# Patient Record
Sex: MALE | Race: WHITE | NOT HISPANIC OR LATINO | Employment: FULL TIME | URBAN - METROPOLITAN AREA
[De-identification: names, ages, dates, MRNs, and addresses within clinical notes are randomized per-mention and may not be internally consistent; named-entity substitution may affect disease eponyms.]

---

## 2024-02-02 ENCOUNTER — HOSPITAL ENCOUNTER (OUTPATIENT)
Facility: HOSPITAL | Age: 44
Discharge: HOME | End: 2024-02-02
Payer: COMMERCIAL

## 2024-02-02 ENCOUNTER — OFFICE VISIT (OUTPATIENT)
Dept: INTERNAL MEDICINE | Age: 44
End: 2024-02-02
Payer: COMMERCIAL

## 2024-02-02 VITALS
HEIGHT: 68 IN | HEART RATE: 82 BPM | BODY MASS INDEX: 27.58 KG/M2 | OXYGEN SATURATION: 94 % | TEMPERATURE: 97.6 F | DIASTOLIC BLOOD PRESSURE: 80 MMHG | SYSTOLIC BLOOD PRESSURE: 110 MMHG | WEIGHT: 182 LBS

## 2024-02-02 DIAGNOSIS — Z00.00 ANNUAL PHYSICAL EXAM: Primary | ICD-10-CM

## 2024-02-02 DIAGNOSIS — M54.50 LUMBAR PAIN: ICD-10-CM

## 2024-02-02 PROCEDURE — 72100 X-RAY EXAM L-S SPINE 2/3 VWS: CPT

## 2024-02-02 NOTE — PROGRESS NOTES
"    I N T E R N A L  M E D I C I KALPESH E  GREY Everett      ENCOUNTER DATE:  2024    Ivan Aslanov / 43 y.o. / male    CHIEF COMPLAINT     Establish Care    [place]  used at today's appointment.    In , was told he had a lumbar disc herniation from a hospital in [place].  Has ongoing intermittent episodes of left lumbar pain with radiation to buttock, left hip and leg.   No numbness, tingling, weakness, bowel/ bladder changes.  He has been able to manage his pain symptoms through home exercises.  Denies any prior surgeries.      VITALS     Visit Vitals  /80   Pulse 82   Temp 97.6 °F (36.4 °C)   Ht 173 cm (68.11\")   Wt 82.6 kg (182 lb)   SpO2 94%   BMI 27.58 kg/m²       BP Readings from Last 3 Encounters:   24 110/80     Wt Readings from Last 3 Encounters:   24 82.6 kg (182 lb)      Body mass index is 27.58 kg/m².      MEDICATIONS     No current outpatient medications on file prior to visit.     No current facility-administered medications on file prior to visit.         HISTORY OF PRESENT ILLNESS      Ivan presents for annual health maintenance visit.    General health: fair  Lifestyle:  Attempting to lose weight?: No   Diet: eats moderately healthy  Exercise: exercises occasionally  Tobacco: Former smoker   Alcohol: occasional/infrequent  Work: Not working currently but looking  Reproductive health:  Sexually active?: Yes   Concern for STD?: No   Sexual problems?: No problems   Sees Urologist?: No   Depression Screenin/2/2024    11:06 AM   PHQ-2/PHQ-9 Depression Screening   Little Interest or Pleasure in Doing Things 0-->not at all   Feeling Down, Depressed or Hopeless 0-->not at all   PHQ-9: Brief Depression Severity Measure Score 0         PHQ-2: 0 (Not depressed)     PHQ-9: 0 (Negative screening for depression)    Patient Care Team:  Jazzmine Rios APRN as PCP - General (Family " Medicine)  ______________________________________________________________________    ALLERGIES  Allergies   Allergen Reactions    Other Anaphylaxis     Metamizole        PFSH:     The following portions of the patient's history were reviewed and updated as appropriate: Allergies / Current Medications / Past Medical History / Surgical History / Social History / Family History    PROBLEM LIST   There is no problem list on file for this patient.      PAST MEDICAL HISTORY  History reviewed. No pertinent past medical history.    SURGICAL HISTORY  History reviewed. No pertinent surgical history.    SOCIAL HISTORY  Social History     Socioeconomic History    Marital status:    Tobacco Use    Smoking status: Former     Packs/day: 1.00     Years: 15.00     Additional pack years: 0.00     Total pack years: 15.00     Types: Cigarettes     Start date: 1/1/2009    Smokeless tobacco: Never   Vaping Use    Vaping Use: Never used   Substance and Sexual Activity    Alcohol use: Yes     Comment: 1 day weekly    Drug use: Never    Sexual activity: Yes     Partners: Female       FAMILY HISTORY  Family History   Problem Relation Age of Onset    Hypertension Mother        IMMUNIZATION HISTORY    There is no immunization history on file for this patient.      REVIEW OF SYSTEMS     Review of Systems   Constitutional:  Negative for chills, fever and unexpected weight change.   Respiratory:  Negative for cough, chest tightness and shortness of breath.    Cardiovascular:  Negative for chest pain, palpitations and leg swelling.   Musculoskeletal:  Positive for back pain.   Neurological:  Negative for dizziness, weakness, light-headedness and headaches.   Psychiatric/Behavioral:  The patient is not nervous/anxious.        PHYSICAL EXAMINATION     Physical Exam  Vitals reviewed.   Constitutional:       General: He is not in acute distress.     Appearance: Normal appearance. He is not ill-appearing, toxic-appearing or diaphoretic.   HENT:  "     Head: Normocephalic and atraumatic.      Right Ear: Tympanic membrane, ear canal and external ear normal. There is no impacted cerumen.      Left Ear: Tympanic membrane, ear canal and external ear normal. There is no impacted cerumen.      Nose: Nose normal. No congestion or rhinorrhea.      Mouth/Throat:      Mouth: Mucous membranes are moist.      Pharynx: Oropharynx is clear. No oropharyngeal exudate or posterior oropharyngeal erythema.   Eyes:      Extraocular Movements: Extraocular movements intact.      Conjunctiva/sclera: Conjunctivae normal.      Pupils: Pupils are equal, round, and reactive to light.   Cardiovascular:      Rate and Rhythm: Normal rate and regular rhythm.      Heart sounds: Normal heart sounds.   Pulmonary:      Effort: Pulmonary effort is normal. No respiratory distress.      Breath sounds: Normal breath sounds.   Abdominal:      General: Bowel sounds are normal.      Palpations: Abdomen is soft.      Tenderness: There is no abdominal tenderness.   Musculoskeletal:         General: Normal range of motion.      Cervical back: Normal range of motion and neck supple.      Lumbar back: No tenderness or bony tenderness.      Right lower leg: No edema.      Left lower leg: No edema.   Lymphadenopathy:      Cervical: No cervical adenopathy.   Skin:     General: Skin is warm and dry.   Neurological:      General: No focal deficit present.      Mental Status: He is alert and oriented to person, place, and time. Mental status is at baseline.      Sensory: No sensory deficit.      Motor: No weakness.   Psychiatric:         Mood and Affect: Mood normal.         Behavior: Behavior normal.         Thought Content: Thought content normal.         Judgment: Judgment normal.         REVIEWED DATA      Labs:    No results found for: \"NA\", \"K\", \"CALCIUM\", \"AST\", \"ALT\", \"BUN\", \"CREATININE\", \"EGFRIFNONA\", \"EGFRIFAFRI\"    No results found for: \"GLUCOSE\", \"HGBA1C\", \"TSH\", \"FREET4\"    No results found for: " "\"PSA\"    [unfilled]    No results found for: \"LDL\", \"HDL\", \"TRIG\", \"CHOLHDLRATIO\"    No components found for: \"BOHZ048O\"    No results found for: \"WBC\", \"HGB\", \"MCV\", \"PLT\"    No results found for: \"PROTEIN\", \"GLUCOSEU\", \"BLOODU\", \"NITRITEU\", \"LEUKOCYTESUR\"     No results found for: \"HEPCVIRUSABY\"    Imaging:           Medical Tests:           ASSESSMENT & PLAN     ANNUAL WELLNESS EXAM / PHYSICAL     Diagnoses and all orders for this visit:    1. Annual physical exam (Primary)  -     CBC & Differential  -     Comprehensive Metabolic Panel  -     Hemoglobin A1c  -     Lipid Panel With / Chol / HDL Ratio  -     TSH+Free T4  -     Urinalysis without microscopic (no culture) - Urine, Clean Catch  -     Hepatitis C Antibody    2. Lumbar pain  -     XR Spine Lumbar 2 or 3 View  -     Ambulatory Referral to Physical Therapy Evaluate and treat         Summary/Discussion:     For his ongoing lumbar pain symptoms, recommend updating XR and referral to PT.  He was instructed on importance of monitoring for any extremity numbness, tingling, weakness, bowel/ bladder changes, saddle anesthesia, for which he would need evaluation in the ER.      BMI is >= 25 and <30. (Overweight) The following options were offered after discussion;: exercise counseling/recommendations and nutrition counseling/recommendations    Next Appointment with me: Visit date not found    Return in about 1 year (around 2/2/2025) for Annual physical.      HEALTHCARE MAINTENANCE ISSUES       Cancer Screening:  Colon: Initial/Next screening at age: 46  Repeat colon cancer screening: N/A at this time  Prostate: No screening needed at this time  Testicular: Recommended monthly self exam  Skin: Monthly self skin examination, annual exam by health professional  Lung: Does not meet criteria for lung cancer screening.   Other:    Screening Labs & Tests:  Lab results reviewed & discussed with with patient or orders placed today.  EKG:  CV Screening: Lipid panel  DEXA (75+ " or risk factors):   HEP C (If born [birth year] or risk factors): Ordered  Other:     Immunization/Vaccinations (to be given today unless deferred by patient)  Influenza: Recommended annual influenza vaccine  Hepatitis A: Verify immunization records  Hepatitis B: Verify immunization records  Tetanus/Pertussis: Up to date  Pneumovax/PCV: Not needed at this time  Shingles: Not needed at this time  COVID: Does not plan to get the latest booster  Lifestyle Counseling:  Lifestyle Modifications: Attempt to lose weight, Improve dietary compliance, Begin progressive aerobic exercise program 3-5 days a week, Continue to abstain from smoking, and Reduce exposure to stress if possible  Safety Issues: Always wear seatbelt, Avoid texting while driving   Use sunscreen, regular skin examination  Recommended annual dental/vision examination.  Emotional/Stress/Sleep: Reviewed and  given when appropriate      Health Maintenance   Topic Date Due    HEPATITIS C SCREENING  Never done    COVID-19 Vaccine (2 - 2023-24 season) 02/04/2024 (Originally 9/1/2023)    ANNUAL PHYSICAL  02/02/2025    BMI FOLLOWUP  02/02/2025    TDAP/TD VACCINES (2 - Td or Tdap) 11/09/2033    INFLUENZA VACCINE  Completed    Pneumococcal Vaccine 0-64  Aged Out

## 2024-03-11 ENCOUNTER — TREATMENT (OUTPATIENT)
Age: 44
End: 2024-03-11
Payer: COMMERCIAL

## 2024-03-11 DIAGNOSIS — M79.605 PAIN OF LEFT LOWER EXTREMITY: ICD-10-CM

## 2024-03-11 DIAGNOSIS — M54.50 LUMBAR PAIN: Primary | ICD-10-CM

## 2024-03-11 NOTE — PROGRESS NOTES
Physical Therapy Initial Evaluation and Plan of Care      Patient: Ivan Baldwin   : 1980  Diagnosis/ICD-10 Code:  Lumbar pain [M54.50]  Referring practitioner: GREY Everett  Date of Initial Visit: 3/11/2024  Today's Date: 3/11/2024  Patient seen for 1 sessions         Visit Diagnoses:     ICD-10-CM ICD-9-CM   1. Lumbar pain  M54.50 724.2   2. Pain of left lower extremity  M79.605 729.5       Subjective Questionnaire: Oswestry: 10/50    Subjective Evaluation    History of Present Illness    Subjective comment: Pt is 42 y/o M who speaks [place] and requires translation services. He presents to therapy c/o low back pain that he has been dealing with for approx. 8-9 years. He reports pain begins in mid lumbar region and travels down L LE. Denies previous treatment for back pain and denies N/T. He reports difficulty sitting, standing, and walking.  Patient Occupation: Currently unemployed Pain  Current pain ratin  At best pain ratin  Location: Mid lumbar region radiating into LLE  Alleviating factors: stretching.  Exacerbated by: weather.    Patient Goals  Patient goals for therapy: decreased pain           Objective          Static Posture     Thoracic Spine  Hypokyphosis.    Lumbar Spine   Increased lordosis.     Tenderness     Lumbar Spine  Tenderness in the spinous process.     Additional Tenderness Details  Sacrum  L piriformis    Neurological Testing     Sensation     Lumbar   Left   Intact: light touch    Right   Intact: light touch    Active Range of Motion     Lumbar   Flexion: 88 degrees with pain  Extension: 15 degrees with pain  Left lateral flexion: 27 degrees   Right lateral flexion: 21 degrees with pain  Left rotation: WFL and with pain  Right rotation: WFL    Additional Active Range of Motion Details  Decreased joint mobility upper and mid thoracic spine    Strength/Myotome Testing     Left Hip   Planes of Motion   Flexion: 5  Extension: 4-    Right Hip   Planes of Motion    Flexion: 5  Extension: 4    Left Knee   Flexion: 5  Extension: 5    Right Knee   Flexion: 5  Extension: 5    Left Ankle/Foot   Dorsiflexion: 5    Right Ankle/Foot   Dorsiflexion: 5    Additional Strength Details  Noted pain with L knee ext    Lower Ab: 4-/5    Tests       Thoracic   Positive slump.     Lumbar     Left   Positive passive SLR.     Additional Tests Details  No change with long axis traction    Slump + on L    Lumbar Flexibility Comments:   B HS mild limited  B piriformis mild limited    Ambulation     Observational Gait   Walking speed and stride length within functional limits.     Quality of Movement During Gait   Trunk    Trunk (Right): Positive lateral lean over stance limb.         Patient Education: Educated pt on HEP and POC.    Assessment & Plan       Assessment  Impairments: abnormal gait, abnormal or restricted ROM, activity intolerance, impaired physical strength, lacks appropriate home exercise program and pain with function   Functional limitations: walking, uncomfortable because of pain, sitting and standing   Assessment details: Pt is 43 y.o. male who speaks [place] and requires translation services. He presents to therapy c/o low back pain that he has been dealing with for approx. 8-9 years. He reports pain begins in mid lumbar region and travels down L LE. He reports difficulty sitting, standing, and walking. Modified Oswestry score 10/50. Deficits observed in LAROM, BLE strength, core strength, BLE, and gait. Pt requires skilled therapy addressing deficits in order to return to PLOF. Educated pt on HEP and POC, pt verbalized understanding.  Prognosis: good    Goals  Plan Goals: STG 4 Weeks:    Pt will demonstrate understanding of initial HEP without need for cueing.    Pt will rate pain 3/10 or less for ease with sitting.    Pt will demonstrate increased flexibility of B piriformis to WNL for decreased pain with ADLs.      LTG by Discharge:    Pt will be independent with HEP for  return to PLOF with decreased symptoms.    Pt will rate pain 2/10 or less for ease with standing.    Pt will demonstrate increased strength of B hip ext and lower abdominals to 4+/5 or greater for improvement and stability in gait.    Pt will ambulate community distances with gait mechanics WNL without pain for return to PLOF.    Oswestry score will improve to 5/10 or better for improvement in function with ADLs.    Plan  Therapy options: will be seen for skilled therapy services  Planned modality interventions: cryotherapy, dry needling, ultrasound, traction, thermotherapy (hydrocollator packs) and TENS  Planned therapy interventions: abdominal trunk stabilization, manual therapy, neuromuscular re-education, body mechanics training, postural training, flexibility, functional ROM exercises, home exercise program, soft tissue mobilization, spinal/joint mobilization, strengthening, stretching, therapeutic activities and joint mobilization  Frequency: 2x week  Duration in weeks: 12  Treatment plan discussed with: patient        History # of Personal Factors and/or Comorbidities: LOW (0)  Examination of Body System(s): # of elements: LOW (1-2)  Clinical Presentation: STABLE   Clinical Decision Making: LOW       Timed:         Manual Therapy:         mins  96289;     Therapeutic Exercise:    10     mins  50308;     Neuromuscular Nichole:        mins  12303;    Therapeutic Activity:          mins  40594;     Gait Training:           mins  18110;     Ultrasound:          mins  29660;    Ionto                                   mins   38788  Self Care                            mins   03380      Un-Timed:  Electrical Stimulation:         mins  33666 ( );  Traction          mins 46169  Low Eval     45     Mins  08672  Mod Eval          Mins  33561  High Eval                            Mins  81219        Timed Treatment:   10   mins   Total Treatment:     55   mins      PT SIGNATURE: Tanya Cabrera PT   Physical  Therapist  [place] License #: [id number]  [place] License #: [id number]    DATE TREATMENT INITIATED: 3/11/2024    Initial Certification  Certification Period: 3/11/2024 thru 6/8/2024  I certify that the therapy services are furnished while this patient is under my care.  The services outlined above are required by this patient, and will be reviewed every 90 days.      Physician Signature: _________________________  PHYSICIAN: Jazzmine Rios APRN   NPI: [id number]                                             DATE: _____________________________________    Please sign and return via fax to [phone]. Thank you, [facility].

## 2024-03-21 ENCOUNTER — TREATMENT (OUTPATIENT)
Age: 44
End: 2024-03-21
Payer: COMMERCIAL

## 2024-03-21 DIAGNOSIS — M79.605 PAIN OF LEFT LOWER EXTREMITY: ICD-10-CM

## 2024-03-21 DIAGNOSIS — M54.50 LUMBAR PAIN: Primary | ICD-10-CM

## 2024-03-21 PROCEDURE — 97535 SELF CARE MNGMENT TRAINING: CPT | Performed by: PHYSICAL THERAPIST

## 2024-03-21 PROCEDURE — 97110 THERAPEUTIC EXERCISES: CPT | Performed by: PHYSICAL THERAPIST

## 2024-03-21 PROCEDURE — 97530 THERAPEUTIC ACTIVITIES: CPT | Performed by: PHYSICAL THERAPIST

## 2024-03-21 PROCEDURE — 97116 GAIT TRAINING THERAPY: CPT | Performed by: PHYSICAL THERAPIST

## 2024-03-21 NOTE — PROGRESS NOTES
Physical Therapy Daily Treatment Note    [facility] - [facility]  [address]  [address]  [place] [address]     Patient: Ivan Baldwin   : 1980  Referring practitioner: GREY Everett  Date of Initial Visit: Type: THERAPY  Noted: 3/11/2024  Today's Date: 3/21/2024  Patient seen for 2 sessions         Ivan Baldwin reports: via  that he has a spot on the left side of his low back that is bothersome/painful.   States that he felt better after initial exercise performance today but noted an increase in left low back and buttock after treadmill walking.  Symptoms/discomfort decreased after performance of exercise regimen again.        Subjective     Objective   -tender left PSIS/buttock with palpation  -noted pelvic malalignment L but able to correct with isometric sequencing regimen    See Exercise, Manual, and Modality Logs for complete treatment.   Discussed via :  Exercise rationale/ pain free exercise performance  Anatomy and structure of affected musculature  Ice/heat application  Lumbar support - towel roll  Log roll  Squat/golfers lift vs bend at waist   Alternate exercise positions  Verbal/Tactile cues to ensure correct exercise performance/technique    Added:Nu step, sktc, hip add ball squeezes, hip abd isometric belt/grey band    -updated HEP program and issued to patient.  Removed hs stretch with strap.  Hold ppt, bridge, etc.    Treadmill walking x 8 min for gait normalization(increased step/stride length/foot clearance) and positional trunk maintenance.  1.2-1.6 mph    Assessment/Plan  Exercise performance results in decreased patient subjective complaints of pain left sided low back.  Evident L pelvic malalignment that responds to corrective sequencing exercises.  Benefits from education regarding condition and strategies to improve posture with sitting/driving, lifting techniques and log roll out of bed.  Should improve with continued PT  intervention        Progress per Plan of Care and Progress strengthening /stabilization /functional activity           Timed:  Manual Therapy:         mins  57630;  Therapeutic Exercise:    25     mins  30745;     Neuromuscular Nichole:        mins  20114;    Therapeutic Activity:    10      mins  11439;     Gait Trainin    mins  11008;     Ultrasound:          mins  28857;    Self Care                    _10__      mins 07284    Untimed:  Electrical Stimulation:         mins  84794 ( );  Mechanical Traction:         mins  73069;     Timed Treatment:  53   mins   Total Treatment:     53   mins  Dwayne Leggett [facility]  Physical Therapist  Assistant  [id number]

## 2024-03-25 ENCOUNTER — TREATMENT (OUTPATIENT)
Age: 44
End: 2024-03-25
Payer: COMMERCIAL

## 2024-03-25 DIAGNOSIS — M79.605 PAIN OF LEFT LOWER EXTREMITY: ICD-10-CM

## 2024-03-25 DIAGNOSIS — M54.50 LUMBAR PAIN: Primary | ICD-10-CM

## 2024-03-25 NOTE — PROGRESS NOTES
Physical Therapy Daily Treatment Note    [facility] - [facility]  [address]  [address]  [place] [address]     Patient: Ivan Baldwin   : 1980  Referring practitioner: GREY Everett  Date of Initial Visit: Type: THERAPY  Noted: 3/11/2024  Today's Date: 3/25/2024  Patient seen for 3 sessions         Ivan Baldwin reports: via  that his back felt better over the weekend.  Notes that primary discomfort is along L low back(PSIS) and hip(along piriformis and glut minimus insertion)        Subjective     Objective   -normal pelvic alignment noted this session  -tender L PSIS, L piriformis and glut minimus insertion site with palpation.  Noted trigger point with deep pressure     See Exercise, Manual, and Modality Logs for complete treatment.   Bridge with ppt, abd bracing with LINSEY carrasco, BKFO with grey t band     -discussed via , pain free exercise performance of stretches as well as self care tips/techniques to address left glut/piriformis tightness/triggerpoint with tennis/lacrosse ball.     Assessment/Plan  Good recall and performance of HEP with minimal verbal/tactile cues required.  Able to progress exercises(stabilization abd) and reps for affected musculature without increased symptoms only early mm fatigue. Noted triggerpoitn with palpation along left glut minimus and piriformis with noted reduction and improved pain free mobility reported post manual intervention by patient.         Progress strengthening /stabilization /functional activity for affected musculature.           Timed:  Manual Therapy:    15     mins  24173;  Therapeutic Exercise:    25     mins  41058;     Neuromuscular Nichole:        mins  92705;    Therapeutic Activity:    15     mins  13337;     Gait Training:           mins  54046;     Ultrasound:          mins  06141;    Self Care                    __5_      mins 22778    Untimed:  Electrical Stimulation:         mins  45222 (  );  Mechanical Traction:         mins  66716;     Timed Treatment: 60     mins   Total Treatment:    60    mins  Dwayne Leggett PTA  Physical Therapist  Assistant  [id number]

## 2024-03-28 ENCOUNTER — TREATMENT (OUTPATIENT)
Age: 44
End: 2024-03-28
Payer: COMMERCIAL

## 2024-03-28 DIAGNOSIS — M54.50 LUMBAR PAIN: Primary | ICD-10-CM

## 2024-03-28 DIAGNOSIS — M79.605 PAIN OF LEFT LOWER EXTREMITY: ICD-10-CM

## 2024-03-28 NOTE — PROGRESS NOTES
"Physical Therapy Daily Treatment Note    [facility] - [facility]  [address]  [address]  [place] [address]     Patient: Ivan Baldwin   : 1980  Referring practitioner: GREY Everett  Date of Initial Visit: Type: THERAPY  Noted: 3/11/2024  Today's Date: 3/28/2024  Patient seen for 4 sessions         Ivan Baldwin reports: low back is feeling better.  Still with spot and \"little pain\" on the left side of his back        Subjective     Objective   Decreased triggerpoint size L piriformis/glut minimus insertion.     See Exercise, Manual, and Modality Logs for complete treatment.   Added 90/90 hs stretch back into HEP    discussed via , pain free exercise performance of stretches especially hamstrings due to tightness..  Reviewed purpose of exercises(ROM, stretching and strengthening) as well as number of reps and reasoning behind that.     Assessment/Plan  Decreased subjective complaints of L sided LB discomfort, feels stretches are helping. Able to progress exercises without increased discomfort of LBP, just early fatigue of isolated hip and core musculature.  Benefits from verbal/tactile cues to ensure proper exercise performance, technique, positioning.  Should continue to improve with PT intervention.          Progress per Plan of Care and Progress strengthening /stabilization /functional activity           Timed:  Manual Therapy:    12    mins  69247;  Therapeutic Exercise:    32     mins  52125;     Neuromuscular Nichole:        mins  64600;    Therapeutic Activity:     10     mins  40024;     Gait Training:           mins  70149;     Ultrasound:          mins  46853;    Self Care                    _5__      mins 86018    Untimed:  Electrical Stimulation:         mins  12496 ( );  Mechanical Traction:         mins  57066;     Timed Treatment:  59   mins   Total Treatment:      59  mins  Dwayne Leggett PTA  Physical Therapist  Assistant  [id number]  "

## 2024-04-04 ENCOUNTER — TREATMENT (OUTPATIENT)
Age: 44
End: 2024-04-04
Payer: COMMERCIAL

## 2024-04-04 DIAGNOSIS — M79.605 PAIN OF LEFT LOWER EXTREMITY: ICD-10-CM

## 2024-04-04 DIAGNOSIS — M54.50 LUMBAR PAIN: Primary | ICD-10-CM

## 2024-04-04 NOTE — PROGRESS NOTES
Physical Therapy Daily Treatment Note    [facility] PT - [facility]  [address]  [address]  [place] [address]     Patient: Ivan Baldwin   : 1980  Referring practitioner: GREY Everett  Date of Initial Visit: Type: THERAPY  Noted: 3/11/2024  Today's Date: 2024  Patient seen for 5 sessions         Ivan Baldwin reports: via  that he feels~60% improved overall.  Low back is feeling better, just a spot in the left glut that bothers patient when sitting/driving to long and when laying on left side on the couch.        Subjective     Objective   -tender glut minimus insertion with moderate/deep pressure  -tight left hip ER with passive stretching    See Exercise, Manual, and Modality Logs for complete treatment.   -importance of continued exercise performance in regards to L hip ER stretching and continued hs stretching/core stabilization.    -self soft tissue mobilization with tennis/lacrosse ball at home L glut minimus.      Assessment/Plan  Overall improvement to LB with PT interventions.  Now with primary deep tissue L glut minimus irritation associated with tight hip external rotators.  Subjectively reports 60% improvement since start of PT and objectively demonstrates improved pain free trunk flexibility all planes without pain and only limited in flexion plane by hamstring tightness.        Progress per Plan of Care toward all goals.  Progress stretching for L hip IR/ER            Timed:  Manual Therapy: 10       mins  25283;  Therapeutic Exercise:   14      mins  22087;     Neuromuscular Nichole:        mins  93908;    Therapeutic Activity:          mins  61514;     Gait Training:           mins  35408;     Ultrasound:          mins  57596;    Self Care                    _10__      mins 75218    Untimed:  Electrical Stimulation:         mins  37915 ( );  Mechanical Traction:         mins  53311;     Timed Treatment:   34   mins   Total Treatment:   34      denisse Leggett, [facility]  Physical Therapist  Assistant  [id number]